# Patient Record
Sex: FEMALE | URBAN - METROPOLITAN AREA
[De-identification: names, ages, dates, MRNs, and addresses within clinical notes are randomized per-mention and may not be internally consistent; named-entity substitution may affect disease eponyms.]

---

## 2024-10-10 ENCOUNTER — APPOINTMENT (OUTPATIENT)
Dept: PRIMARY CARE | Facility: CLINIC | Age: 54
End: 2024-10-10

## 2024-10-10 VITALS
SYSTOLIC BLOOD PRESSURE: 101 MMHG | HEART RATE: 101 BPM | DIASTOLIC BLOOD PRESSURE: 69 MMHG | WEIGHT: 210.1 LBS | HEIGHT: 69 IN | BODY MASS INDEX: 31.12 KG/M2 | RESPIRATION RATE: 20 BRPM

## 2024-10-10 DIAGNOSIS — Z00.00 ANNUAL PHYSICAL EXAM: Primary | ICD-10-CM

## 2024-10-10 PROCEDURE — 99396 PREV VISIT EST AGE 40-64: CPT | Performed by: INTERNAL MEDICINE

## 2024-10-10 PROCEDURE — 3008F BODY MASS INDEX DOCD: CPT | Performed by: INTERNAL MEDICINE

## 2024-10-10 RX ORDER — ALPRAZOLAM 0.25 MG/1
TABLET ORAL
COMMUNITY
Start: 2018-01-17 | End: 2024-10-10 | Stop reason: WASHOUT

## 2024-10-10 RX ORDER — ALBUTEROL SULFATE 90 UG/1
INHALANT RESPIRATORY (INHALATION)
COMMUNITY
Start: 2019-05-06

## 2024-10-10 ASSESSMENT — ENCOUNTER SYMPTOMS
SHORTNESS OF BREATH: 1
SLEEP DISTURBANCE: 1
FATIGUE: 1

## 2024-10-10 NOTE — PROGRESS NOTES
"Subjective   Patient ID: Savannah Bonilla is a 54 y.o. female who presents for Establish Care (Also has some pain down right trap to elbow).    Here to get established after last PCP retired.    PMH:  -Used to take robinul for vertigo but has improved off medication for years.  -Will take xanax as needed for anxiety when flying.  -Anxiety: Was told to use an albuterol inhaler when an attack would come on. Tries to meditate when anxiety comes on. Last episode 2 weeks ago, but last one prior to that was beginning of the summer. Doesn't have a therapist and isn't on medication otherwise.  -Migraines: Used to be on topamax but hasn't needed that since. When they occur they can last for weeks. Last had an episode like that occur 8 years ago.        Review of Systems   Constitutional:  Positive for fatigue.   Respiratory:  Positive for shortness of breath.    Cardiovascular:  Negative for chest pain.   Psychiatric/Behavioral:  Positive for sleep disturbance.        /69 (BP Location: Right arm, Patient Position: Sitting)   Pulse 101   Resp 20   Ht 1.74 m (5' 8.5\")   Wt 95.3 kg (210 lb 1.6 oz)   BMI 31.48 kg/m²   Objective   Physical Exam  Constitutional:       General: She is not in acute distress.     Appearance: She is obese. She is not ill-appearing, toxic-appearing or diaphoretic.   HENT:      Head: Normocephalic and atraumatic.   Eyes:      Conjunctiva/sclera: Conjunctivae normal.   Cardiovascular:      Rate and Rhythm: Normal rate and regular rhythm.      Heart sounds: No murmur heard.     No friction rub. No gallop.   Pulmonary:      Effort: Pulmonary effort is normal. No respiratory distress.      Breath sounds: No stridor. No wheezing, rhonchi or rales.   Abdominal:      General: Abdomen is flat. Bowel sounds are normal. There is no distension.      Palpations: Abdomen is soft.      Tenderness: There is no abdominal tenderness. There is no guarding.   Musculoskeletal:      Cervical back: Normal range " of motion. No rigidity or tenderness.      Right lower leg: No edema.      Left lower leg: No edema.   Lymphadenopathy:      Cervical: No cervical adenopathy.   Skin:     General: Skin is warm and dry.   Neurological:      Mental Status: She is alert.         Assessment/Plan   Problem List Items Addressed This Visit    None  Visit Diagnoses         Codes    Annual physical exam    -  Primary Z00.00    Relevant Orders    Comprehensive metabolic panel    CBC    Hemoglobin A1c    Lipid panel    Tsh With Reflex To Free T4 If Abnormal    Vitamin D 25-Hydroxy,Total (for eval of Vitamin D levels)        -Labwork as above.  -Pt planning to get colonoscopy done at [facility].  -Recently had mammogram with asymmetry noted at [facility]; plans to get diagnostic and US done next.  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 Will follow up next week. Declines flu shot.  -Pt w/ R-sided shoulder pain and hx insomnia (some consideration for CARISSA per her previous provider). To follow up in 1-2 weeks to discuss issues further as there was no time at end of physical today. Pt agreeable with plan.         Agustin Akers MD 10/10/24 2:21 PM

## 2024-10-23 ENCOUNTER — APPOINTMENT (OUTPATIENT)
Dept: PRIMARY CARE | Facility: CLINIC | Age: 54
End: 2024-10-23
Payer: COMMERCIAL

## 2024-11-06 ENCOUNTER — APPOINTMENT (OUTPATIENT)
Dept: PRIMARY CARE | Facility: CLINIC | Age: 54
End: 2024-11-06
Payer: COMMERCIAL

## 2024-11-11 ENCOUNTER — APPOINTMENT (OUTPATIENT)
Dept: PRIMARY CARE | Facility: CLINIC | Age: 54
End: 2024-11-11
Payer: COMMERCIAL

## 2024-11-18 ENCOUNTER — APPOINTMENT (OUTPATIENT)
Dept: PRIMARY CARE | Facility: CLINIC | Age: 54
End: 2024-11-18
Payer: COMMERCIAL

## 2024-12-04 ENCOUNTER — APPOINTMENT (OUTPATIENT)
Dept: PRIMARY CARE | Facility: CLINIC | Age: 54
End: 2024-12-04
Payer: COMMERCIAL